# Patient Record
Sex: MALE | Race: WHITE | Employment: STUDENT | URBAN - METROPOLITAN AREA
[De-identification: names, ages, dates, MRNs, and addresses within clinical notes are randomized per-mention and may not be internally consistent; named-entity substitution may affect disease eponyms.]

---
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
## 2022-07-12 ENCOUNTER — MED REC SCAN ONLY (OUTPATIENT)
Dept: INTERNAL MEDICINE CLINIC | Facility: CLINIC | Age: 22
End: 2022-07-12

## 2022-08-22 ENCOUNTER — MED REC SCAN ONLY (OUTPATIENT)
Dept: INTERNAL MEDICINE CLINIC | Facility: CLINIC | Age: 22
End: 2022-08-22

## 2022-12-02 ENCOUNTER — OFFICE VISIT (OUTPATIENT)
Dept: FAMILY MEDICINE CLINIC | Facility: CLINIC | Age: 22
End: 2022-12-02
Payer: COMMERCIAL

## 2022-12-02 VITALS
TEMPERATURE: 98 F | DIASTOLIC BLOOD PRESSURE: 70 MMHG | BODY MASS INDEX: 37.89 KG/M2 | HEIGHT: 68 IN | SYSTOLIC BLOOD PRESSURE: 122 MMHG | HEART RATE: 99 BPM | OXYGEN SATURATION: 97 % | RESPIRATION RATE: 18 BRPM | WEIGHT: 250 LBS

## 2022-12-02 DIAGNOSIS — J02.9 SORE THROAT: ICD-10-CM

## 2022-12-02 DIAGNOSIS — J02.0 STREP PHARYNGITIS: Primary | ICD-10-CM

## 2022-12-02 DIAGNOSIS — R68.89 FLU-LIKE SYMPTOMS: ICD-10-CM

## 2022-12-02 LAB
CONTROL LINE PRESENT WITH A CLEAR BACKGROUND (YES/NO): YES YES/NO
KIT LOT #: NORMAL NUMERIC
STREP GRP A CUL-SCR: POSITIVE

## 2022-12-02 PROCEDURE — 3074F SYST BP LT 130 MM HG: CPT | Performed by: NURSE PRACTITIONER

## 2022-12-02 PROCEDURE — 87637 SARSCOV2&INF A&B&RSV AMP PRB: CPT | Performed by: NURSE PRACTITIONER

## 2022-12-02 PROCEDURE — 87880 STREP A ASSAY W/OPTIC: CPT | Performed by: NURSE PRACTITIONER

## 2022-12-02 PROCEDURE — 99213 OFFICE O/P EST LOW 20 MIN: CPT | Performed by: NURSE PRACTITIONER

## 2022-12-02 PROCEDURE — 3078F DIAST BP <80 MM HG: CPT | Performed by: NURSE PRACTITIONER

## 2022-12-02 PROCEDURE — 3008F BODY MASS INDEX DOCD: CPT | Performed by: NURSE PRACTITIONER

## 2022-12-02 RX ORDER — CLINDAMYCIN HYDROCHLORIDE 300 MG/1
300 CAPSULE ORAL 3 TIMES DAILY
Qty: 30 CAPSULE | Refills: 0 | Status: SHIPPED | OUTPATIENT
Start: 2022-12-02 | End: 2022-12-12

## 2022-12-03 LAB
FLUAV + FLUBV RNA SPEC NAA+PROBE: NOT DETECTED
FLUAV + FLUBV RNA SPEC NAA+PROBE: NOT DETECTED
RSV RNA SPEC NAA+PROBE: NOT DETECTED
SARS-COV-2 RNA RESP QL NAA+PROBE: NOT DETECTED

## 2024-10-24 ENCOUNTER — LAB ENCOUNTER (OUTPATIENT)
Dept: LAB | Age: 24
End: 2024-10-24
Attending: INTERNAL MEDICINE
Payer: COMMERCIAL

## 2024-10-24 ENCOUNTER — OFFICE VISIT (OUTPATIENT)
Dept: INTERNAL MEDICINE CLINIC | Facility: CLINIC | Age: 24
End: 2024-10-24
Payer: COMMERCIAL

## 2024-10-24 VITALS
WEIGHT: 273.63 LBS | SYSTOLIC BLOOD PRESSURE: 124 MMHG | HEART RATE: 80 BPM | DIASTOLIC BLOOD PRESSURE: 66 MMHG | OXYGEN SATURATION: 100 % | HEIGHT: 67.32 IN | TEMPERATURE: 98 F | RESPIRATION RATE: 18 BRPM | BODY MASS INDEX: 42.45 KG/M2

## 2024-10-24 DIAGNOSIS — F41.9 ANXIETY: ICD-10-CM

## 2024-10-24 DIAGNOSIS — R41.840 DIFFICULTY CONCENTRATING: ICD-10-CM

## 2024-10-24 DIAGNOSIS — Z13.0 SCREENING FOR BLOOD DISEASE: ICD-10-CM

## 2024-10-24 DIAGNOSIS — Z13.228 SCREENING FOR METABOLIC DISORDER: ICD-10-CM

## 2024-10-24 DIAGNOSIS — Z13.220 SCREENING FOR LIPID DISORDERS: ICD-10-CM

## 2024-10-24 DIAGNOSIS — F32.A DEPRESSION, UNSPECIFIED DEPRESSION TYPE: ICD-10-CM

## 2024-10-24 DIAGNOSIS — Z13.29 SCREENING FOR THYROID DISORDER: ICD-10-CM

## 2024-10-24 DIAGNOSIS — Z00.00 ROUTINE GENERAL MEDICAL EXAMINATION AT A HEALTH CARE FACILITY: ICD-10-CM

## 2024-10-24 DIAGNOSIS — Z00.00 ROUTINE GENERAL MEDICAL EXAMINATION AT A HEALTH CARE FACILITY: Primary | ICD-10-CM

## 2024-10-24 DIAGNOSIS — E66.813 CLASS 3 OBESITY: ICD-10-CM

## 2024-10-24 LAB
ALBUMIN SERPL-MCNC: 4.7 G/DL (ref 3.2–4.8)
ALBUMIN/GLOB SERPL: 1.6 {RATIO} (ref 1–2)
ALP LIVER SERPL-CCNC: 75 U/L
ALT SERPL-CCNC: 22 U/L
ANION GAP SERPL CALC-SCNC: 6 MMOL/L (ref 0–18)
AST SERPL-CCNC: 19 U/L (ref ?–34)
BASOPHILS # BLD AUTO: 0.08 X10(3) UL (ref 0–0.2)
BASOPHILS NFR BLD AUTO: 0.8 %
BILIRUB SERPL-MCNC: 0.8 MG/DL (ref 0.3–1.2)
BUN BLD-MCNC: 8 MG/DL (ref 9–23)
CALCIUM BLD-MCNC: 9.8 MG/DL (ref 8.7–10.4)
CHLORIDE SERPL-SCNC: 105 MMOL/L (ref 98–112)
CHOLEST SERPL-MCNC: 181 MG/DL (ref ?–200)
CO2 SERPL-SCNC: 28 MMOL/L (ref 21–32)
CREAT BLD-MCNC: 0.94 MG/DL
EGFRCR SERPLBLD CKD-EPI 2021: 118 ML/MIN/1.73M2 (ref 60–?)
EOSINOPHIL # BLD AUTO: 0.1 X10(3) UL (ref 0–0.7)
EOSINOPHIL NFR BLD AUTO: 1 %
ERYTHROCYTE [DISTWIDTH] IN BLOOD BY AUTOMATED COUNT: 13.7 %
FASTING PATIENT LIPID ANSWER: YES
FASTING STATUS PATIENT QL REPORTED: YES
GLOBULIN PLAS-MCNC: 2.9 G/DL (ref 2–3.5)
GLUCOSE BLD-MCNC: 88 MG/DL (ref 70–99)
HCT VFR BLD AUTO: 42 %
HDLC SERPL-MCNC: 47 MG/DL (ref 40–59)
HGB BLD-MCNC: 13.8 G/DL
IMM GRANULOCYTES # BLD AUTO: 0.04 X10(3) UL (ref 0–1)
IMM GRANULOCYTES NFR BLD: 0.4 %
LDLC SERPL CALC-MCNC: 115 MG/DL (ref ?–100)
LYMPHOCYTES # BLD AUTO: 2.71 X10(3) UL (ref 1–4)
LYMPHOCYTES NFR BLD AUTO: 28.2 %
MCH RBC QN AUTO: 27.5 PG (ref 26–34)
MCHC RBC AUTO-ENTMCNC: 32.9 G/DL (ref 31–37)
MCV RBC AUTO: 83.8 FL
MONOCYTES # BLD AUTO: 0.69 X10(3) UL (ref 0.1–1)
MONOCYTES NFR BLD AUTO: 7.2 %
NEUTROPHILS # BLD AUTO: 5.99 X10 (3) UL (ref 1.5–7.7)
NEUTROPHILS # BLD AUTO: 5.99 X10(3) UL (ref 1.5–7.7)
NEUTROPHILS NFR BLD AUTO: 62.4 %
NONHDLC SERPL-MCNC: 134 MG/DL (ref ?–130)
OSMOLALITY SERPL CALC.SUM OF ELEC: 286 MOSM/KG (ref 275–295)
PLATELET # BLD AUTO: 270 10(3)UL (ref 150–450)
POTASSIUM SERPL-SCNC: 4.3 MMOL/L (ref 3.5–5.1)
PROT SERPL-MCNC: 7.6 G/DL (ref 5.7–8.2)
RBC # BLD AUTO: 5.01 X10(6)UL
SODIUM SERPL-SCNC: 139 MMOL/L (ref 136–145)
TRIGL SERPL-MCNC: 104 MG/DL (ref 30–149)
TSI SER-ACNC: 2.4 MIU/ML (ref 0.55–4.78)
VLDLC SERPL CALC-MCNC: 18 MG/DL (ref 0–30)
WBC # BLD AUTO: 9.6 X10(3) UL (ref 4–11)

## 2024-10-24 PROCEDURE — 80061 LIPID PANEL: CPT | Performed by: INTERNAL MEDICINE

## 2024-10-24 PROCEDURE — 80050 GENERAL HEALTH PANEL: CPT | Performed by: INTERNAL MEDICINE

## 2024-10-24 RX ORDER — FLUOXETINE 10 MG/1
10 CAPSULE ORAL DAILY
Refills: 0 | Status: CANCELLED | OUTPATIENT
Start: 2024-10-24

## 2024-10-24 NOTE — PROGRESS NOTES
Beto Sheehan  12/18/2000    Chief Complaint   Patient presents with    [facility] rm - 7 - Establish care       HPI:   Beot Sheehan is a 23 year old male who presents for an annual physical examination.    Since last evaluation the patient reports several stressors, contributing to a worsening of symptoms of anxiety and depression.  Although triggers for his stressors have improved, they have not resolved.  He also reports difficulty with concentration in several different environments of his day-to-day life concerning him for ADHD.  He has not previously undergone evaluation for ADHD.  He denies any acute concerns at this time.    Current Outpatient Medications   Medication Sig Dispense Refill    Fluticasone Propionate 50 MCG/ACT Nasal Suspension by Each Nare route as needed.      FLUoxetine HCl 10 MG Oral Cap Take 10 mg by mouth daily. (Patient not taking: Reported on 10/24/2024)        Allergies[1]   Past Medical History:    Depression      Patient Active Problem List   Diagnosis    Leg skin lesion, left    Dyslipidemia    Class 3 obesity    Episode of recurrent major depressive disorder (HCC)    Anxiety      Past Surgical History:   Procedure Laterality Date    Knee surgery Left 2018      Family History   Problem Relation Age of Onset    Lipids Father     Diabetes Father     Lipids Mother     Heart Disorder Mother     Lipids Maternal Grandmother     Lipids Maternal Grandfather     Lipids Paternal Grandmother     Lipids Paternal Grandfather       Social History     Socioeconomic History    Marital status: Single   Tobacco Use    Smoking status: Never     Passive exposure: Never    Smokeless tobacco: Never   Vaping Use    Vaping status: Former   Substance and Sexual Activity    Alcohol use: Yes     Alcohol/week: 2.0 standard drinks of alcohol     Types: 1 Glasses of wine, 1 Shots of liquor per week     Comment: socially    Drug use: Never         REVIEW OF SYSTEMS:   GENERAL: feels well  otherwise  SKIN: no rashes  EYES:denies blurred vision or double vision  HEENT: not congested  LUNGS: denies shortness of breath with exertion  CARDIOVASCULAR: denies chest pain on exertion  GI: no nausea or abdominal pain  NEURO: denies headaches    EXAM:   /66 (BP Location: Left arm, Patient Position: Sitting, Cuff Size: large)   Pulse 80   Temp 97.6 °F (36.4 °C) (Temporal)   Resp 18   Ht 5' 7.32\" (1.71 m)   Wt 273 lb 9.6 oz (124.1 kg)   SpO2 100%   BMI 42.44 kg/m²   GENERAL: Well developed, well nourished,in no apparent distress  SKIN: No rashes,no suspicious lesions  EYES: Bilateral conjunctiva are clear  HEENT: atraumatic, normocephalic. TM WNL BL.  NECK: supple,no adenopathy,no bruits  LUNGS: clear to auscultation  CARDIO: RRR without murmur  GI: good BS's,no masses, HSM or tenderness    ASSESSMENT AND PLAN:   Beto Sheehan is a 23 year old male who presents for an annual physical examination.    Outstanding screening and preventive measures:  Influenza immunization: Administered today  Tetanus immunization: Tdap administered today    Anxiety and depression:  Resume fluoxetine only at the 20 mg once daily dose  Electronic update in 3 weeks  Referred for behavioral therapy     Difficulty concentrating:  Referred for neuropsychiatric testing; recommendations to follow    Class III obesity:  Continue efforts to improve dietary and lifestyle habits    Routine/screening laboratory evaluation pending collection; recommendations to follow    The patient indicates understanding of these issues and agrees to the plan.    TODAY'S ORDERS     Orders Placed This Encounter   Procedures    Fluzone trivalent vaccine, PF 0.5mL, 6mo+ (89824)    TdaP (Boostrix) Vaccine (> 7 Y)       Meds & Refills:  Requested Prescriptions      No prescriptions requested or ordered in this encounter       Imaging & Consults:  INFLUENZA VACCINE, TRI, PRESERV FREE, 0.5 ML  TETANUS, DIPHTHERIA TOXOIDS AND ACELLULAR PERTUSIS VACCINE  (TDAP), >7 YEARS, IM USE    No follow-ups on file.  There are no Patient Instructions on file for this visit.    All questions were answered and the patient agrees with the plan.     Thank you,  Oren Samano MD       [1]   Allergies  Allergen Reactions    Amoxicillin HIVES    Azithromycin HIVES    Penicillin G HIVES    Penicillins HIVES

## 2024-10-25 NOTE — TELEPHONE ENCOUNTER
Patient called, he was seen yesterday and recommended to resume Fluoxetine 20 mg once daily. Rx has not yet been sent to pharmacy.  Rx pended

## 2024-10-27 ENCOUNTER — TELEPHONE (OUTPATIENT)
Age: 24
End: 2024-10-27

## 2024-10-28 NOTE — TELEPHONE ENCOUNTER
Please review.  Medication is listed as patient reported.    Office visit 10/24/24 \"Anxiety and depression:  Resume fluoxetine only at the 20 mg once daily dose  Electronic update in 3 weeks  Referred for behavioral therapy\"      Requested Prescriptions   Pending Prescriptions Disp Refills    FLUoxetine 20 MG Oral Cap 90 capsule 0     Sig: Take 1 capsule (20 mg total) by mouth daily.       Psychiatric Non-Scheduled (Anti-Anxiety) Passed - 10/28/2024  9:08 AM        Passed - In person appointment or virtual visit in the past 6 mos or appointment in next 3 mos     Recent Outpatient Visits              4 days ago Routine general medical examination at a health care facility    [facility], [address] [place] [address] [place] Oren Samano MD    Office Visit    1 year ago Strep pharyngitis    [facility], [facility], [address] [place] [address] Antonella Maloney APRN    Office Visit    3 years ago Laboratory examination ordered as part of a complete physical examination    [address] Oren Gautam MD    Office Visit    6 years ago Leg skin lesion, left    [facility], Rosendo Perdomo [place], [place]    Office Visit    6 years ago Leg skin lesion, left    [facility], [address], Benedict Madrigal MD    Office Visit                      Passed - Depression Screening completed within the past 12 months             Recent Outpatient Visits              4 days ago Routine general medical examination at a health care facility    [facility], [address] [place] [address] Oren Gautam MD    Office Visit    1 year ago Strep pharyngitis    [facility], [facility], [address] [place] [address] Antonella Maloney APRN    Office Visit    3 years ago Laboratory examination ordered as part of a complete physical examination    [address],  Oren Gautam MD    Office Visit    6 years ago Leg skin lesion, left    [facility], [address], Loida Bolivar PA    Office Visit    6 years ago Leg skin lesion, left    [facility], [address], Benedict Madrigal MD    Office Visit

## (undated) NOTE — Clinical Note
I had the pleasure of seeing Mr. Jose Lehman in my office today. Please see my attached note.       Casey Mckeon

## (undated) NOTE — LETTER
05/03/21        [place] [id number] [facility] [address]      Dear Gene Felix records indicate that you have outstanding lab work and or testing that was ordered for you and has not yet been completed:  Orders Placed This Encounter

## (undated) NOTE — LETTER
Date: 12/2/2022    Patient Name: Gladys Waddell          To Whom it may concern: This letter has been written at the patient's request. The above patient was seen at the [facility] for treatment of a medical condition. This patient should be excused from attending work/school from 12/2/2022 through 12/5/2022. The patient may return to work/school on 12/5/2022.         Sincerely,        [facility] KEIKO ABDUL

## (undated) NOTE — LETTER
2018  Return to School / Work    Name: Lindsay Edmondson        : 2000    To Whom It May Concern,    Lindsay Edmondson had surgery on 18 and is:    Able to return to school / work with restrictions:  No Gym/Indoors for recess until 18.